# Patient Record
Sex: FEMALE | Race: WHITE | NOT HISPANIC OR LATINO | Employment: FULL TIME | URBAN - METROPOLITAN AREA
[De-identification: names, ages, dates, MRNs, and addresses within clinical notes are randomized per-mention and may not be internally consistent; named-entity substitution may affect disease eponyms.]

---

## 2024-09-17 ENCOUNTER — OFFICE VISIT (OUTPATIENT)
Dept: URGENT CARE | Age: 54
End: 2024-09-17
Payer: COMMERCIAL

## 2024-09-17 ENCOUNTER — CLINICAL SUPPORT (OUTPATIENT)
Dept: URGENT CARE | Age: 54
End: 2024-09-17
Payer: COMMERCIAL

## 2024-09-17 VITALS
BODY MASS INDEX: 25.2 KG/M2 | OXYGEN SATURATION: 93 % | DIASTOLIC BLOOD PRESSURE: 63 MMHG | WEIGHT: 125 LBS | RESPIRATION RATE: 16 BRPM | HEART RATE: 85 BPM | HEIGHT: 59 IN | TEMPERATURE: 98.3 F | SYSTOLIC BLOOD PRESSURE: 104 MMHG

## 2024-09-17 DIAGNOSIS — U07.1 COVID-19: ICD-10-CM

## 2024-09-17 DIAGNOSIS — R06.02 SOB (SHORTNESS OF BREATH): Primary | ICD-10-CM

## 2024-09-17 DIAGNOSIS — R06.02 SOB (SHORTNESS OF BREATH): ICD-10-CM

## 2024-09-17 LAB
POC BINAX EXPIRATION: ABNORMAL
POC BINAX NOW COVID SERIAL NUMBER: ABNORMAL
POC RAPID INFLUENZA A: NEGATIVE
POC RAPID INFLUENZA B: NEGATIVE
POC SARS-COV-2 AG BINAX: ABNORMAL

## 2024-09-17 RX ORDER — SERTRALINE HYDROCHLORIDE 25 MG/1
25 TABLET, FILM COATED ORAL DAILY
COMMUNITY

## 2024-09-17 RX ORDER — ALBUTEROL SULFATE 90 UG/1
2 INHALANT RESPIRATORY (INHALATION) EVERY 4 HOURS PRN
Qty: 8.5 G | Refills: 0 | Status: SHIPPED | OUTPATIENT
Start: 2024-09-17 | End: 2025-09-17

## 2024-09-17 RX ORDER — ARIPIPRAZOLE 5 MG/1
5 TABLET ORAL DAILY
COMMUNITY

## 2024-09-17 RX ORDER — IPRATROPIUM BROMIDE AND ALBUTEROL SULFATE 2.5; .5 MG/3ML; MG/3ML
3 SOLUTION RESPIRATORY (INHALATION) ONCE
Status: CANCELLED | OUTPATIENT
Start: 2024-09-17

## 2024-09-17 RX ORDER — METHYLPREDNISOLONE 4 MG/1
TABLET ORAL
Qty: 21 TABLET | Refills: 0 | Status: SHIPPED | OUTPATIENT
Start: 2024-09-17 | End: 2024-09-24

## 2024-09-17 ASSESSMENT — ENCOUNTER SYMPTOMS
COUGH: 1
SHORTNESS OF BREATH: 1

## 2024-09-17 NOTE — PROGRESS NOTES
"Subjective   Patient ID: Hailee Naranjo is a 54 y.o. female. They present today with a chief complaint of Cough and shortness on breath (Onset 3 days ago, worse today).    History of Present Illness  54-year-old female patient with history of anxiety, depression and tobacco use presents to urgent care today with a complaint of shortness of breath and dry cough since Friday.  She denies any chest pain, difficulty breathing, fevers, chills, nausea or vomiting.  No other complaints or concerns mentioned this time.      History provided by:  Patient  Cough  Associated symptoms include shortness of breath.       Past Medical History  Allergies as of 09/17/2024    (Not on File)       (Not in a hospital admission)         Past Medical History:   Diagnosis Date    Anxiety disorder, unspecified     Anxiety    Major depressive disorder, single episode, unspecified 11/08/2022    MDD (major depressive disorder), single episode    Personal history of other diseases of the musculoskeletal system and connective tissue 08/11/2020    History of left foot drop       Past Surgical History:   Procedure Laterality Date    OTHER SURGICAL HISTORY  06/15/2020    Oophorectomy    OTHER SURGICAL HISTORY  06/15/2020    Appendectomy    OTHER SURGICAL HISTORY  06/15/2020    History Of Prior Surgery        reports that she has been smoking cigarettes. She does not have any smokeless tobacco history on file.    Review of Systems  Review of Systems   Respiratory:  Positive for cough and shortness of breath.                                   Objective    Vitals:    09/17/24 1933   BP: 104/63   Pulse: 85   Resp: 16   Temp: 36.8 °C (98.3 °F)   SpO2: 93%   Weight: 56.7 kg (125 lb)   Height: 1.499 m (4' 11\")     No LMP recorded.    Physical Exam  Vitals and nursing note reviewed.   Constitutional:       General: She is not in acute distress.     Appearance: Normal appearance. She is not ill-appearing, toxic-appearing or diaphoretic.   HENT:      Head: " Normocephalic and atraumatic.      Right Ear: Tympanic membrane, ear canal and external ear normal.      Left Ear: Tympanic membrane, ear canal and external ear normal.      Nose: Congestion present.      Mouth/Throat:      Mouth: Mucous membranes are moist.      Pharynx: No oropharyngeal exudate or posterior oropharyngeal erythema.   Eyes:      Extraocular Movements: Extraocular movements intact.      Conjunctiva/sclera: Conjunctivae normal.      Pupils: Pupils are equal, round, and reactive to light.   Cardiovascular:      Rate and Rhythm: Normal rate and regular rhythm.      Pulses: Normal pulses.      Heart sounds: Normal heart sounds.   Pulmonary:      Effort: Pulmonary effort is normal. No respiratory distress.      Breath sounds: Normal breath sounds. No stridor. No wheezing, rhonchi or rales.   Chest:      Chest wall: No tenderness.   Musculoskeletal:         General: Normal range of motion.      Cervical back: Normal range of motion and neck supple.   Skin:     General: Skin is warm and dry.      Capillary Refill: Capillary refill takes less than 2 seconds.   Neurological:      General: No focal deficit present.      Mental Status: She is alert and oriented to person, place, and time.   Psychiatric:         Mood and Affect: Mood normal.         Behavior: Behavior normal.         Procedures      Assessment/Plan   Allergies, medications, history, and pertinent labs/EKGs/Imaging reviewed by KARTIK Kaur.     Medical Decision Making  Patient presents with flulike symptoms since Friday.  Differential includes COVID, influenza, pneumonia, URI, other.  Initial oxygen saturation on room air was noted to be 93%.  Patient was in no acute distress and was able to speak in full sentences without difficulty.  She did receive a DuoNeb nebulizer treatment while waiting for chest x-ray.  X-ray independently reviewed myself interpreted as no acute cardiopulmonary process.  Patient tested positive for COVID.   Oxygen saturation reassessed after nebulizer treatment and noted to be 98% on room air.  At this time, feel patient can be safely discharged home with strict return precautions.  The prescription for albuterol inhaler and Medrol Dosepak called to her pharmacy to use as directed.  Close follow-up with PCP.    Orders and Diagnoses  Diagnoses and all orders for this visit:  SOB (shortness of breath)  -     POCT Covid-19 Rapid Antigen  -     POCT Influenza A/B manually resulted  -     XR chest 2 views; Future      Medical Admin Record      Follow Up Instructions  No follow-ups on file.    Patient disposition: Home    Electronically signed by [facility]  8:01 PM

## 2024-09-18 NOTE — PATIENT INSTRUCTIONS
You were seen at Urgent Care today for cough and shortness of breath.  You were diagnosed with COVID-19.  Please treat as discussed. Please take medications as prescribed. Monitor for red flags which we spoke about, If your symptoms change, worsen or become concerning in any way, please go to the emergency room immediately, otherwise you can followup with your PCP in 2-3 days as needed

## 2025-07-30 ENCOUNTER — OFFICE VISIT (OUTPATIENT)
Dept: URGENT CARE | Age: 55
End: 2025-07-30
Payer: COMMERCIAL

## 2025-07-30 VITALS
TEMPERATURE: 98.2 F | RESPIRATION RATE: 16 BRPM | OXYGEN SATURATION: 96 % | HEART RATE: 68 BPM | DIASTOLIC BLOOD PRESSURE: 76 MMHG | SYSTOLIC BLOOD PRESSURE: 129 MMHG

## 2025-07-30 DIAGNOSIS — H66.90 ACUTE OTITIS MEDIA, UNSPECIFIED OTITIS MEDIA TYPE: Primary | ICD-10-CM

## 2025-07-30 DIAGNOSIS — H61.22 IMPACTED CERUMEN OF LEFT EAR: ICD-10-CM

## 2025-07-30 PROCEDURE — 99213 OFFICE O/P EST LOW 20 MIN: CPT | Performed by: PERSONAL EMERGENCY RESPONSE ATTENDANT

## 2025-07-30 PROCEDURE — 69209 REMOVE IMPACTED EAR WAX UNI: CPT | Performed by: PERSONAL EMERGENCY RESPONSE ATTENDANT

## 2025-07-30 RX ORDER — CLINDAMYCIN HYDROCHLORIDE 300 MG/1
300 CAPSULE ORAL 3 TIMES DAILY
Qty: 30 CAPSULE | Refills: 0 | Status: SHIPPED | OUTPATIENT
Start: 2025-07-30 | End: 2025-08-09

## 2025-07-30 ASSESSMENT — ENCOUNTER SYMPTOMS
EYES NEGATIVE: 1
MUSCULOSKELETAL NEGATIVE: 1
RESPIRATORY NEGATIVE: 1
PSYCHIATRIC NEGATIVE: 1
CARDIOVASCULAR NEGATIVE: 1
CONSTITUTIONAL NEGATIVE: 1
GASTROINTESTINAL NEGATIVE: 1

## 2025-07-30 NOTE — PROGRESS NOTES
Subjective   Patient ID: Hailee Naranjo is a 55 y.o. female. They present today with a chief complaint of Earache (Left ear pain/clogged since yesterday).    History of Present Illness  55-year-old female comes in today with chief complaint of muffled sounds and left ear pain since yesterday.  She stated that she noticed it after taking a shower.  Pain appears to be increasing as well as the muffled sounds.  She denies any recent illnesses or injuries.  She denies specifically any chest pain, sore throat, fever/chills, or nasal congestion.          Past Medical History  Allergies as of 07/30/2025    (No Known Allergies)       Prescriptions Prior to Admission[1]     Medical History[2]    Surgical History[3]     reports that she has been smoking cigarettes. She has never used smokeless tobacco.    Review of Systems  Review of Systems   Constitutional: Negative.    HENT:  Positive for ear pain and hearing loss.    Eyes: Negative.    Respiratory: Negative.     Cardiovascular: Negative.    Gastrointestinal: Negative.    Genitourinary: Negative.    Musculoskeletal: Negative.    Skin: Negative.    Psychiatric/Behavioral: Negative.     All other systems reviewed and are negative.                                 Objective    Vitals:    07/30/25 0950   BP: 129/76   Pulse: 68   Resp: 16   Temp: 36.8 °C (98.2 °F)   TempSrc: Oral   SpO2: 96%     No LMP recorded.    Physical Exam  Vitals and nursing note reviewed.   Constitutional:       Appearance: Normal appearance. She is normal weight.   HENT:      Head: Normocephalic and atraumatic.      Left Ear: There is impacted cerumen.      Nose: Nose normal.      Mouth/Throat:      Mouth: Mucous membranes are moist.      Pharynx: Oropharynx is clear.     Eyes:      Extraocular Movements: Extraocular movements intact.      Conjunctiva/sclera: Conjunctivae normal.       Cardiovascular:      Rate and Rhythm: Normal rate.   Pulmonary:      Effort: Pulmonary effort is normal.   Abdominal:       General: Abdomen is flat.   Genitourinary:     Comments: No CVA tenderness or pubic pain.    Musculoskeletal:         General: Normal range of motion.     Skin:     General: Skin is warm and dry.     Neurological:      General: No focal deficit present.      Mental Status: She is alert and oriented to person, place, and time.     Psychiatric:         Mood and Affect: Mood normal.         Behavior: Behavior normal.         Ear Cerumen Removal    Date/Time: 7/30/2025 9:06 PM    Performed by: Obed Beyer PA-C  Authorized by: Obed Beyer PA-C    Consent:     Consent obtained:  Verbal    Consent given by:  Patient    Risks, benefits, and alternatives were discussed: yes      Risks discussed:  Bleeding, infection, dizziness, incomplete removal, pain and TM perforation    Alternatives discussed:  No treatment and delayed treatment  Universal protocol:     Procedure explained and questions answered to patient or proxy's satisfaction: yes      Relevant documents present and verified: yes      Patient identity confirmed:  Verbally with patient  Procedure details:     Location:  L ear    Procedure type: irrigation      Procedure outcomes: cerumen removed    Post-procedure details:     Inspection:  Ear canal clear    Hearing quality:  Normal    Procedure completion:  Tolerated      Point of Care Test & Imaging Results from this visit  No results found for this visit on 07/30/25.   Imaging  No results found.    Cardiology, Vascular, and Other Imaging  No other imaging results found for the past 2 days      Diagnostic study results (if any) were reviewed by Obed Beyer PA-C.    Assessment/Plan   Allergies, medications, history, and pertinent labs/EKGs/Imaging reviewed by Obed Beyer PA-C.     Medical Decision Making  55-year-old female comes in today with chief complaint of left-sided ear pain since yesterday.  She also has slight hearing loss.  Both of those symptoms appear to be getting worse over the  last day or so.  During examination it was noted that she had cerumen impaction on the left side.  Patient was irrigated with complete removal of cerumen on the left side.  Hair loss was return to normal.  She did have some irritation of the tympanic membrane upon visualization after removal.  I did offer her antibiotics and she did state that she did not want any penicillin and requested clindamycin.  Patient will be given a prescription for clindamycin.  Patient is stable for discharge and request to go home.  Discharge instructions were given.    Orders and Diagnoses  Diagnoses and all orders for this visit:  Acute otitis media, unspecified otitis media type  -     clindamycin (Cleocin HCL) 300 mg capsule; Take 1 capsule (300 mg) by mouth 3 times a day for 10 days.  Impacted cerumen of left ear      Medical Admin Record      Patient disposition: Home    Electronically signed by Obed Beyer PA-C  10:17 AM           [1] (Not in a hospital admission)  [2]   Past Medical History:  Diagnosis Date    Anxiety disorder, unspecified     Anxiety    Major depressive disorder, single episode, unspecified 11/08/2022    MDD (major depressive disorder), single episode    Personal history of other diseases of the musculoskeletal system and connective tissue 08/11/2020    History of left foot drop   [3]   Past Surgical History:  Procedure Laterality Date    OTHER SURGICAL HISTORY  06/15/2020    Oophorectomy    OTHER SURGICAL HISTORY  06/15/2020    Appendectomy    OTHER SURGICAL HISTORY  06/15/2020    History Of Prior Surgery